# Patient Record
Sex: FEMALE | Race: WHITE
[De-identification: names, ages, dates, MRNs, and addresses within clinical notes are randomized per-mention and may not be internally consistent; named-entity substitution may affect disease eponyms.]

---

## 2018-10-03 ENCOUNTER — HOSPITAL ENCOUNTER (OUTPATIENT)
Dept: HOSPITAL 47 - RADMAMWWP | Age: 48
Discharge: HOME | End: 2018-10-03
Attending: FAMILY MEDICINE
Payer: COMMERCIAL

## 2018-10-03 DIAGNOSIS — Z12.31: Primary | ICD-10-CM

## 2018-10-03 PROCEDURE — 77067 SCR MAMMO BI INCL CAD: CPT

## 2018-10-03 PROCEDURE — 77063 BREAST TOMOSYNTHESIS BI: CPT

## 2018-10-05 NOTE — MM
Reason for exam: screening  (asymptomatic).

Last mammogram was performed 2 years and 7 months ago.



History:

Patient is postmenopausal.



Physical Findings:

A clinical breast exam by your physician is recommended on an annual basis and 

results should be correlated with mammographic findings.



MG 3D Screening Mammo W/Cad

Bilateral CC and MLO view(s) were taken.

Prior study comparison: March 5, 2016, bilateral MG screening mammo w CAD.  

February 11, 2015, bilateral MG screening mammo w CAD.

The breast tissue is extremely dense which could obscure a lesion on mammography.

Finding: There is a 3 mm oval mass located 6 cm from the nipple in the upper 

quadrant, middle position of the right breast.

New finding since March 5, 2016 and February 11, 2015.





ASSESSMENT: Incomplete: need additional imaging evaluation, BI-RAD 0



RECOMMENDATION:

Special view mammogram of the right breast.



If lesion persists on supplemental views, image directed ultrasound is 

recommended.



[facility] will attempt to contact patient to return for supplemental 

views and ultrasound if indicated.

## 2018-10-26 ENCOUNTER — HOSPITAL ENCOUNTER (OUTPATIENT)
Dept: HOSPITAL 47 - RADMAMWWP | Age: 48
Discharge: HOME | End: 2018-10-26
Attending: FAMILY MEDICINE
Payer: COMMERCIAL

## 2018-10-26 DIAGNOSIS — R92.8: Primary | ICD-10-CM

## 2018-10-26 PROCEDURE — 77065 DX MAMMO INCL CAD UNI: CPT

## 2018-10-26 PROCEDURE — 77061 BREAST TOMOSYNTHESIS UNI: CPT

## 2018-10-26 NOTE — MM
Reason for exam: additional evaluation requested from abnormal screening.

Last mammogram was performed 1 month ago.



History:

Patient is postmenopausal.



Physical Findings:

Nurse did not find any significant physical abnormalities on exam.



MG 3D Work Up W/Cad RT

Spot compression CC, spot compression MLO, and ML view(s) were taken of the right 

breast.

Prior study comparison: October 3, 2018, bilateral MG 3d screening mammo w/cad.  

March 5, 2016, bilateral MG screening mammo w CAD.

There is no discrete abnormality including area of concern.



These results were verbally communicated with the patient and result sheet given 

to the patient on 10/26/18.





ASSESSMENT: Probably benign, BI-RAD 3



RECOMMENDATION:

Follow-up diagnostic mammogram of the right breast in 6 months.

## 2020-11-06 ENCOUNTER — HOSPITAL ENCOUNTER (OUTPATIENT)
Dept: HOSPITAL 47 - ORWHC2ENDO | Age: 50
Discharge: HOME | End: 2020-11-06
Attending: SURGERY
Payer: COMMERCIAL

## 2020-11-06 VITALS — TEMPERATURE: 97.6 F

## 2020-11-06 VITALS — HEART RATE: 79 BPM | SYSTOLIC BLOOD PRESSURE: 116 MMHG | DIASTOLIC BLOOD PRESSURE: 72 MMHG

## 2020-11-06 VITALS — RESPIRATION RATE: 17 BRPM

## 2020-11-06 VITALS — BODY MASS INDEX: 25.7 KG/M2

## 2020-11-06 DIAGNOSIS — Z90.49: ICD-10-CM

## 2020-11-06 DIAGNOSIS — Z79.890: ICD-10-CM

## 2020-11-06 DIAGNOSIS — Z12.11: Primary | ICD-10-CM

## 2020-11-06 DIAGNOSIS — F17.200: ICD-10-CM

## 2020-11-06 DIAGNOSIS — E07.9: ICD-10-CM

## 2020-11-06 PROCEDURE — 45378 DIAGNOSTIC COLONOSCOPY: CPT

## 2020-11-06 NOTE — P.OP
Date of Procedure: 11/06/20


Preoperative Diagnosis: 


Screening colonoscopy


Postoperative Diagnosis: 


Normal colonoscopy


Anesthesia: FRANCOISA


Surgeon: Dylan Duffy


Pathology: none sent


Condition: stable


Disposition: PACU


Description of Procedure: 


W


PROCEDURE:  The patient was placed on the endoscopy table in the lateral 

position.  Digital rectal examination was performed which revealed no 

abnormalities.  .  Flexible colonoscope was then placed in the patient's anus 

and passed throughout the entire colon.  The ileocecal valve was visualized.  

The cecum, ascending, transverse, descending and sigmoid colon were normal.  The

rectum was normal as well.  There were no masses, polyps or diverticula noted in

the entire colon. 





SUMMARY OF FINDINGS:  


Normal colonoscopy.

## 2020-11-06 NOTE — P.GSHP
History of Present Illness


H&P Date: 11/06/20


Chief Complaint: Screening colonoscopy





This 49-year-old female presents today for screening colonoscopy patient denies 

any significant GI complaints.





Past Medical History


Past Medical History: Thyroid Disorder


History of Any Multi-Drug Resistant Organisms: None Reported


Past Surgical History: Appendectomy


Past Anesthesia/Blood Transfusion Reactions: No Reported Reaction


Smoking Status: Current every day smoker





Medications and Allergies


                                Home Medications











 Medication  Instructions  Recorded  Confirmed  Type


 


Levothyroxine Sodium [Synthroid] 88 mcg PO DAILY 11/04/20 11/06/20 History








                                    Allergies











Allergy/AdvReac Type Severity Reaction Status Date / Time


 


No Known Allergies Allergy   Verified 11/06/20 09:50














Surgical - Exam


                                   Vital Signs











Temp Pulse Resp BP Pulse Ox


 


 97.6 F   89   16   123/74   98 


 


 11/06/20 09:37  11/06/20 09:37  11/06/20 09:37  11/06/20 09:37  11/06/20 09:37














- General


well developed, no distress





- Eyes


PERRL





- ENT


normal pinna





- Neck


no masses





- Respiratory


normal expansion





- Cardiovascular


Rhythm: regular





- Abdomen


Abdomen: soft, non tender





Assessment and Plan


Assessment: 





We'll perform screening colonoscopy

## 2025-05-16 ENCOUNTER — HOSPITAL ENCOUNTER (OUTPATIENT)
Dept: HOSPITAL 47 - RADMAMWWP | Age: 55
Discharge: HOME | End: 2025-05-16
Attending: FAMILY MEDICINE
Payer: COMMERCIAL

## 2025-05-16 DIAGNOSIS — R92.333: ICD-10-CM

## 2025-05-16 DIAGNOSIS — Z78.0: ICD-10-CM

## 2025-05-16 DIAGNOSIS — Z12.31: Primary | ICD-10-CM

## 2025-05-16 DIAGNOSIS — Z80.3: ICD-10-CM

## 2025-05-16 PROCEDURE — 77063 BREAST TOMOSYNTHESIS BI: CPT

## 2025-05-16 PROCEDURE — 77067 SCR MAMMO BI INCL CAD: CPT
